# Patient Record
Sex: MALE | Race: WHITE | URBAN - METROPOLITAN AREA
[De-identification: names, ages, dates, MRNs, and addresses within clinical notes are randomized per-mention and may not be internally consistent; named-entity substitution may affect disease eponyms.]

---

## 2017-07-13 ENCOUNTER — HISTORICAL (OUTPATIENT)
Dept: ADMINISTRATIVE | Facility: HOSPITAL | Age: 62
End: 2017-07-13

## 2017-07-13 LAB
ABS NEUT (OLG): 2.4 X10(3)/MCL
BUN SERPL-MCNC: 8 MG/DL (ref 7–18)
CALCIUM SERPL-MCNC: 8.6 MG/DL (ref 8.5–10.1)
CHLORIDE SERPL-SCNC: 104 MMOL/L (ref 98–107)
CO2 SERPL-SCNC: 27 MMOL/L (ref 21–32)
CREAT SERPL-MCNC: 0.67 MG/DL (ref 0.7–1.3)
ERYTHROCYTE [DISTWIDTH] IN BLOOD BY AUTOMATED COUNT: 12.8 % (ref 11.5–14.8)
GLUCOSE SERPL-MCNC: 94 MG/DL (ref 74–106)
HCT VFR BLD AUTO: 39.7 % (ref 36.2–49.3)
HGB BLD-MCNC: 13.5 GM/DL (ref 12.6–17.3)
MCH RBC QN AUTO: 32.3 PG (ref 28.5–33.8)
MCHC RBC AUTO-ENTMCNC: 34 % (ref 32.6–37)
MCV RBC AUTO: 95 FL (ref 80–99)
PLATELET # BLD AUTO: 219 X10(3)/MCL (ref 136–369)
PMV BLD AUTO: 9 FL (ref 7.4–10.4)
POTASSIUM SERPL-SCNC: 3.9 MMOL/L (ref 3.5–5.1)
RBC # BLD AUTO: 4.18 X10(6)/MCL (ref 4.19–5.75)
SODIUM SERPL-SCNC: 137 MMOL/L (ref 136–145)
WBC # SPEC AUTO: 3.8 X10(3)/MCL (ref 4–10.5)

## 2017-07-14 LAB
ABS NEUT (OLG): 4 X10(3)/MCL (ref 2.1–9.2)
BASOPHILS # BLD AUTO: 0 X10(3)/MCL (ref 0–0.2)
BASOPHILS NFR BLD AUTO: 0 %
BUN SERPL-MCNC: 12 MG/DL (ref 7–18)
CALCIUM SERPL-MCNC: 8.9 MG/DL (ref 8.5–10.1)
CHLORIDE SERPL-SCNC: 106 MMOL/L (ref 98–107)
CO2 SERPL-SCNC: 25 MMOL/L (ref 21–32)
CREAT SERPL-MCNC: 0.82 MG/DL (ref 0.7–1.3)
EOSINOPHIL # BLD AUTO: 0.1 X10(3)/MCL (ref 0–0.9)
EOSINOPHIL NFR BLD AUTO: 1 %
ERYTHROCYTE [DISTWIDTH] IN BLOOD BY AUTOMATED COUNT: 12.7 % (ref 11.5–17)
GLUCOSE SERPL-MCNC: 97 MG/DL (ref 74–106)
HCT VFR BLD AUTO: 38.2 % (ref 42–52)
HGB BLD-MCNC: 12.9 GM/DL (ref 14–18)
LYMPHOCYTES # BLD AUTO: 0.8 X10(3)/MCL (ref 0.6–4.6)
LYMPHOCYTES NFR BLD AUTO: 14 %
MCH RBC QN AUTO: 31.9 PG (ref 27–31)
MCHC RBC AUTO-ENTMCNC: 33.8 GM/DL (ref 33–36)
MCV RBC AUTO: 94.6 FL (ref 80–94)
MONOCYTES # BLD AUTO: 0.6 X10(3)/MCL (ref 0.1–1.3)
MONOCYTES NFR BLD AUTO: 10 %
NEUTROPHILS # BLD AUTO: 4 X10(3)/MCL (ref 2.1–9.2)
NEUTROPHILS NFR BLD AUTO: 74 %
PLATELET # BLD AUTO: 220 X10(3)/MCL (ref 130–400)
PMV BLD AUTO: 9.2 FL (ref 9.4–12.4)
POTASSIUM SERPL-SCNC: 4.3 MMOL/L (ref 3.5–5.1)
RBC # BLD AUTO: 4.04 X10(6)/MCL (ref 4.7–6.1)
SODIUM SERPL-SCNC: 142 MMOL/L (ref 136–145)
WBC # SPEC AUTO: 5.4 X10(3)/MCL (ref 4.5–11.5)

## 2022-04-30 NOTE — OP NOTE
DATE OF SURGERY:    07/13/2017    SURGEON:  Bruno Lockhart MD    PREOPERATIVE DIAGNOSES:  Angina, abnormal nuclear stress test consistent with ischemia, recent drug eluting stent to the mid LAD in the past.  The patient is having significant symptoms and failing medical therapy.  He was supposed to have elective angiogram as the patient is having persistent worsening symptoms and angiogram had to be performed earlier today.  Of note, the patient is supposed to have elective gallbladder surgery.    PROCEDURE:  Left heart catheterization with hemodynamic evaluation of ventricular aorta, selective coronary angiography of the right and left coronary, left ventriculography on the CLARK view.  Following the diagnosis, we performed balloon angioplasty with cutting balloon at level of the ostial diagonal one successfully.    CONTRAST USED:  240 ml Isovue contrast.    FLUOROSCOPY TIME:  10.29 minutes.    ESTIMATED BLOOD LOSS:  25 ml.    ACCESS SITE:  Right common femoral artery with #6 [place] sheath used.    ANESTHESIA:  2% lidocaine for local anesthesia.  Conscious sedation with versed and fentanyl was given.       Heparin was given for anticoagulation.  ACT was documented to be therapeutic.     Nitroglycerin intracoronary was given multiple times.       At the end of the procedure, the sheath was secured in place and will be removed later when ACT normalizes.    CATHETER USED:  #6 [place] for the right coronary JR4, left coronary JL4, pigtail catheter #6 [place] for the ventriculography.  Angioplasty of the diagonal one was done with EBU 3.5.    HEMODYNAMICS:  Hemodynamics was normal.  Blood pressure in the left ventricle was 120/10.  Left ventricle is normal.  Aorta is 120/80.  The patient was in sinus rhythm with heart rate of 60 beats per minute.    ANATOMY:  Left main is large and normal.  LAD proximal mid and distal is patent and normal.  There is a stent at the mid level of the LAD which is drug eluting 3 X  12 that has been placed in the past.  Stent is across the takeoff of the diagonal one.  Diagonal two and three were normal.       The circumflex is a large vessel, normal and patent.  OM1, 2 and 3 are patent.  Right coronary proximal, mid and distal PDA revealed mild luminal irregularities and patent.  Left ventricle is normal in size and function with EF of 60%.  No MR.  No AS.  Thoracic aorta is normal.  Diagonal one at the takeoff of the LAD has been stented across and ostial diagonal one has significant disease with 99% short lesion at the ostium.  This is likely the culprit of the condition. Therefore, we recommend balloon angioplasty.  I am unable to place a stent.  The patient needs to have gallbladder surgery in the near future, so recommend just angioplasty.       Angioplasty diagonal one was performed.  Heparin was given.  ACT was documented to be therapeutic.  Multiple doses of nitroglycerin intracoronary was given with no change in the stenosis.  Therefore, we placed two wires with one to the LAD and another into the diagonal followed with angioplasty coronary wires, and we performed balloon angioplasty of the ostial diagonal one through the stent.  First, we used apex balloon 2 X 12 at 10 atmospheres with some residual, therefore we used AngioSculpt cutting balloon.  We used 2 X 10 at 14 atmospheres, and the results were improved with approximately 10% residual.  No complications.       I was unable to place a stent and very difficult to do at the ostium, and it is very likely the stent will compromise the LAD territory, which is a large territory.  The patient needs gallbladder surgery in the near future.    CONCLUSION:  Severe stenosis at the ostial diagonal one successfully treated with balloon angioplasty and cutting balloon.  At this time, we will continue with dual antiplatelet therapy for approximately 3 weeks, then after that if the patient is stable, we can stop it for a week and perform  elective gallbladder surgery.         Will continue with present medical therapy to maximize statin and anti-angina medicine.  Of note, the blood pressure and heart rate is well controlled with normal ventricle and normal hemodynamics.        ______________________________  MD MILES Rahman/NEERAJ  DD:  07/13/2017  Time:  04:05PM  DT:  07/14/2017  Time:  10:32AM  Job #:  [id number]    cc: MD Ashkan - DAPHNEY Goncalves